# Patient Record
Sex: MALE | Race: WHITE | NOT HISPANIC OR LATINO | URBAN - METROPOLITAN AREA
[De-identification: names, ages, dates, MRNs, and addresses within clinical notes are randomized per-mention and may not be internally consistent; named-entity substitution may affect disease eponyms.]

---

## 2019-08-22 ENCOUNTER — EMERGENCY (EMERGENCY)
Facility: HOSPITAL | Age: 49
LOS: 1 days | Discharge: HOME | End: 2019-08-22
Admitting: EMERGENCY MEDICINE
Payer: COMMERCIAL

## 2019-08-22 VITALS
TEMPERATURE: 98 F | OXYGEN SATURATION: 96 % | RESPIRATION RATE: 18 BRPM | DIASTOLIC BLOOD PRESSURE: 90 MMHG | SYSTOLIC BLOOD PRESSURE: 130 MMHG | HEART RATE: 70 BPM

## 2019-08-22 PROCEDURE — 70450 CT HEAD/BRAIN W/O DYE: CPT | Mod: 26

## 2019-08-22 PROCEDURE — 12011 RPR F/E/E/N/L/M 2.5 CM/<: CPT

## 2019-08-22 PROCEDURE — 99053 MED SERV 10PM-8AM 24 HR FAC: CPT

## 2019-08-22 PROCEDURE — 99284 EMERGENCY DEPT VISIT MOD MDM: CPT | Mod: 25

## 2019-08-22 RX ORDER — TETANUS TOXOID, REDUCED DIPHTHERIA TOXOID AND ACELLULAR PERTUSSIS VACCINE, ADSORBED 5; 2.5; 8; 8; 2.5 [IU]/.5ML; [IU]/.5ML; UG/.5ML; UG/.5ML; UG/.5ML
0.5 SUSPENSION INTRAMUSCULAR ONCE
Refills: 0 | Status: COMPLETED | OUTPATIENT
Start: 2019-08-22 | End: 2019-08-22

## 2019-08-22 NOTE — ED PROVIDER NOTE - NSFOLLOWUPINSTRUCTIONS_ED_ALL_ED_FT
Keep wounds clean and dry  Apply bacitracin twice a day for three days  Suture removal in 7 days - please return here or Urgent Care  Take Ibuprofen 600mg every 6-8 hours as needed for pain, take with food, and in addition you may take Tylenol 500 mg every 6-8 hours as needed for pain    RETURN EARLIER TO THE EMERGENCY DEPARTMENT FOR SEVERE HEADACHE, CONFUSION, VOMITING OR ANY CONCERNING OR WORSENING SYMPTOMS.

## 2019-08-22 NOTE — ED ADULT NURSE NOTE - NSIMPLEMENTINTERV_GEN_ALL_ED
Implemented All Fall Risk Interventions:  [place] to call system. Call bell, personal items and telephone within reach. Instruct patient to call for assistance. Room bathroom lighting operational. Non-slip footwear when patient is off stretcher. Physically safe environment: no spills, clutter or unnecessary equipment. Stretcher in lowest position, wheels locked, appropriate side rails in place. Provide visual cue, wrist band, yellow gown, etc. Monitor gait and stability. Monitor for mental status changes and reorient to person, place, and time. Review medications for side effects contributing to fall risk. Reinforce activity limits and safety measures with patient and family.

## 2019-08-22 NOTE — ED PROVIDER NOTE - PROGRESS NOTE DETAILS
CT brain neg. tetanus updated. wounds cleansed, irrigated with saline, lac repaired with sutures - see procedure note.  in ED now. wound care discussed. patient now awake, alert, coherent, steady gait, a&ox4, clear speech. tolerating PO.

## 2019-08-22 NOTE — ED PROVIDER NOTE - CLINICAL SUMMARY MEDICAL DECISION MAKING FREE TEXT BOX
s/p fall in setting of alcohol ingestion, with lacerations to forehead, ct brain, fingerstick, lac repair, tetanus, reassess.

## 2019-08-22 NOTE — ED PROVIDER NOTE - PHYSICAL EXAMINATION
General: no signs of trauma, somnolent, in no apparent distress.  Head: +lacerations to forehead  neck: no bony stepoffs or tenderness.  HEENT: Airway patent  Eyes: clear bilaterally, pupils equal, round and reactive to light.  Cardiac: Normal rate, regular rhythm.  Heart sounds S1, S2.  No murmurs, rubs or gallops.  Respiratory: Breath sounds clear and equal bilaterally.  Abdomen soft, non-tender, no guarding.  MSK: moving all extremities x 4  Neuro: somnolent, follows commands, ataxia, slurred speech  Skin: normal color. General: no signs of trauma, somnolent, in no apparent distress.  Head: left forehead irregular 1.5cm superficial laceration, no active bleeding or fb. right forehead 2cm irregular laceration jagged edges, no fb or active bleeding .no visible bone  neck: no bony stepoffs or midline tenderness. good ROm  HEENT: Airway patent  Eyes: clear bilaterally, pupils equal, round and reactive to light.  Cardiac: Normal rate, regular rhythm.  Heart sounds S1, S2.  No murmurs, rubs or gallops.  Respiratory: Breath sounds clear and equal bilaterally.  Abdomen soft, non-tender, no guarding.  MSK: moving all extremities x 4  Neuro: somnolent, follows commands, ataxia, slurred speech  Skin: normal color.

## 2019-08-22 NOTE — ED PROVIDER NOTE - OBJECTIVE STATEMENT
49 yo M with no sig pmhx presents BIBEMS s/p fall in setting of alcohol intoxication. patient states he was out with friends, had unknown amount of beers on empty stomach and sustained lacerations to face. unknown last tetanus booster. has no medical complaints. poor historian. history limited due to patient's state.

## 2019-08-22 NOTE — ED ADULT TRIAGE NOTE - CHIEF COMPLAINT QUOTE
PAtient to ED BIBEMS for alcohol intoxication and 2 lacerations to head s/p fall.  Patient admits to drinking beer with friends.

## 2019-08-22 NOTE — ED PROVIDER NOTE - CARE PLAN
Principal Discharge DX:	Alcohol intoxication  Secondary Diagnosis:	Head injury  Secondary Diagnosis:	Laceration of face
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
## 2019-08-27 DIAGNOSIS — S01.81XA LACERATION WITHOUT FOREIGN BODY OF OTHER PART OF HEAD, INITIAL ENCOUNTER: ICD-10-CM
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
## 2019-08-27 DIAGNOSIS — F10.129 ALCOHOL ABUSE WITH INTOXICATION, UNSPECIFIED: ICD-10-CM

## 2019-08-27 DIAGNOSIS — S09.90XA UNSPECIFIED INJURY OF HEAD, INITIAL ENCOUNTER: ICD-10-CM
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
